# Patient Record
(demographics unavailable — no encounter records)

---

## 2025-02-26 NOTE — ASSESSMENT
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  Left Sided hemiparesis and hemiplegia Will maintain blood pressure of normal. Continue with secondary stroke prevention. Will refer patient to PT and OT. He denies any speech or swallow problems.  Likely seizure well-controlled, Vimpat well-controlled on Keppra 50 mg twice a day, will plan to continue.  Seizure and fall precautions.  Patient is here for memory problems as well as for speech and slowness which started in winter 2023. He was an  and completed 4 years of college. MOCA 18/30, on October 27, 2023, consistent with mild cognitive impairment. prior MRI brain shows multiple chronic bl lacunar strokes cw vascular dementia. BP goal of high normal due to posterior circulation stenosis patient seen by neurosurgery for posterior circulation stenosis, no surgery recommended at this time will continue with Aricept 5mg HS as well for dementia.  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 Fetal origin bilateral posterior cerebral arteries. Cd normal LDL 67, hba1c 7 TTE nl LVF, no PFO EKG NSR  I spent the time noted on the day of this patient encounter preparing for, providing and documenting the above E/M service and counseling and educate patient on differential, workup, disease course, and treatment/management. Education was provided to the patient during this encounter. All questions and concerns were answered and addressed in detail. The patient verbalized understanding and agreed to plan. Patient was advised to continue to monitor for neurologic symptoms and to notify my office or go to the nearest emergency room if there are any changes. Any orders/referrals and communications were provided as well.  Side effects of the above medications were discussed in detail including but not limited to applicable black box warning and teratogenicity as appropriate. For patients with seizures, I discussed risk of SUDEP with patient and advised that SUDEP risk can be minimized with good seizure control through medication compliance and other measures. Patient was advised to bring previous records to my office, including CD of imaging, when applicable.  I spent the time noted on the day of this patient encounter preparing for, providing and documenting the above E/M service and counseling and educate patient on differential, workup, disease course, and treatment/management. Education was provided to the patient during this encounter. All questions and concerns were answered and addressed in detail. The patient verbalized understanding and agreed to plan. Patient was advised to continue to monitor for neurologic symptoms and to notify my office or go to the nearest emergency room if there are any changes. Any orders/referrals and communications were provided as well.  Side effects of the above medications were discussed in detail including but not limited to applicable black box warning and teratogenicity as appropriate. For patients with seizures, I discussed risk of SUDEP with patient and advised that SUDEP risk can be minimized with good seizure control through medication compliance and other measures. Patient was advised to bring previous records to my office, including CD of imaging, when applicable.

## 2025-02-26 NOTE — PHYSICAL EXAM
[General Appearance - Alert] : alert [General Appearance - In No Acute Distress] : in no acute distress [Person] : oriented to person [Concentration Intact] : normal concentrating ability [Naming Objects] : no difficulty naming common objects [Fluency] : fluency intact [Comprehension] : comprehension intact [Vocabulary] : adequate range of vocabulary [Cranial Nerves Optic (II)] : visual acuity intact bilaterally,  visual fields full to confrontation, pupils equal round and reactive to light [Cranial Nerves Oculomotor (III)] : extraocular motion intact [Cranial Nerves Trigeminal (V)] : facial sensation intact symmetrically [Cranial Nerves Facial (VII)] : face symmetrical [FreeTextEntry6] : Left Sided hemiparesis and hemiplegia

## 2025-02-26 NOTE — HISTORY OF PRESENT ILLNESS
[FreeTextEntry1] : The patient is here for problems with memory which started in winter 2023. He is more forgetful of names. He is able to continue doing his ADLs, including cooking, cleaning, getting dressed prolonged, going outside and doing his finances without any problems. His wife has noticed that he is also slower and has slurred speech. He also has difficulty with walking. There is no bowel bladder difficulties.  2 sister with AD, Dx at age 70's. No clinical change. No known stroke as per patient.  The patient had intracranial hemorrhage involving the right frontal temporal area complicated with left-sided weakness which occurred on April 17, 2024. The patient was discharged home from [facility] clinic called. He completed home PT occurrence ELB legs with hemiwalker. He continues to have a left arm weakness with minimal movement in this arm, left leg is also weak.  Since last visit, the patient was admitted to near [facility] with left hand shaking with intact mental status, it was felt that this may be as seizure poorly controlled on Vimpat and the patient was started on Keppra 750 mg twice a day and Vimpat was discontinued.  He is tolerating the Keppra well without any further events and no significant side effects.